# Patient Record
Sex: MALE | Race: WHITE | Employment: FULL TIME | URBAN - METROPOLITAN AREA
[De-identification: names, ages, dates, MRNs, and addresses within clinical notes are randomized per-mention and may not be internally consistent; named-entity substitution may affect disease eponyms.]

---

## 2020-09-04 ENCOUNTER — APPOINTMENT (OUTPATIENT)
Dept: GENERAL RADIOLOGY | Facility: CLINIC | Age: 35
End: 2020-09-04
Attending: EMERGENCY MEDICINE
Payer: COMMERCIAL

## 2020-09-04 ENCOUNTER — HOSPITAL ENCOUNTER (EMERGENCY)
Facility: CLINIC | Age: 35
Discharge: HOME | End: 2020-09-04
Attending: EMERGENCY MEDICINE | Admitting: EMERGENCY MEDICINE
Payer: COMMERCIAL

## 2020-09-04 VITALS
BODY MASS INDEX: 29.73 KG/M2 | HEIGHT: 66 IN | WEIGHT: 185 LBS | TEMPERATURE: 98.7 F | OXYGEN SATURATION: 97 % | HEART RATE: 67 BPM | SYSTOLIC BLOOD PRESSURE: 109 MMHG | DIASTOLIC BLOOD PRESSURE: 83 MMHG | RESPIRATION RATE: 16 BRPM

## 2020-09-04 DIAGNOSIS — S61.411A LACERATION OF RIGHT HAND, FOREIGN BODY PRESENCE UNSPECIFIED, INITIAL ENCOUNTER: ICD-10-CM

## 2020-09-04 PROCEDURE — 99283 EMERGENCY DEPT VISIT LOW MDM: CPT

## 2020-09-04 PROCEDURE — 73140 X-RAY EXAM OF FINGER(S): CPT | Mod: RT

## 2020-09-04 PROCEDURE — 12004 RPR S/N/AX/GEN/TRK7.6-12.5CM: CPT

## 2020-09-04 RX ORDER — CEPHALEXIN 500 MG/1
500 CAPSULE ORAL 4 TIMES DAILY
Qty: 28 CAPSULE | Refills: 0 | Status: SHIPPED | OUTPATIENT
Start: 2020-09-04 | End: 2020-09-11

## 2020-09-04 ASSESSMENT — ENCOUNTER SYMPTOMS: WOUND: 1

## 2020-09-04 ASSESSMENT — MIFFLIN-ST. JEOR: SCORE: 1726.9

## 2020-09-04 NOTE — ED PROVIDER NOTES
"History     Chief Complaint:  Laceration       HPI  Mingo Mendoza is a right handed 34 year old male who presents for evaluation of lacerations. The patient states that he was using a chainsaw to cut a branch when the saw slipped and cut the radial dorsal aspect of his right wrist as well as along the dorsal portion of his index finger. Patient's Tdap was in 2013.    Allergies:  No Known Drug Allergies      Medications:   Albuterol   Tessalon     Medical History:   Past medical history reviewed. No pertinent medical history.     Surgical History   Surgical history reviewed. No pertinent surgical history.     Family History:   Family history reviewed. No pertinent family history.     Social History:  Patient was not accompanied to the ED.  Smoking Status: Negative   Smokeless Tobacco: Negative   Alcohol Use: Negative   Drug Use: Negative   Primary Physician: No primary care provider on file.       Review of Systems   Skin: Positive for wound.   All other systems reviewed and are negative.      Physical Exam     Patient Vitals for the past 24 hrs:   BP Temp Temp src Pulse Resp SpO2 Height Weight   09/04/20 1744 96/57 -- -- -- -- -- -- --   09/04/20 1743 (!) 72/40 98.7  F (37.1  C) Temporal 66 18 98 % 1.676 m (5' 6\") 83.9 kg (185 lb)          Physical Exam  Constitutional: White male supine.   MSK: Right arm. 5 cm full thickness laceration over dorsal radial hand extending to wrist. No foreign body seen. 3 cm laceration of dorsum pip joint of index finger. Able to extend finger fully against resistance. Normal sensation to light touch. No foreign body seen. Distal CMS of thumb and all fingers normal.  NEURO: Awake, alert, and appropriate.     Emergency Department Course     Imaging:  Radiology results were communicated with the patient who voiced understanding of the findings.    XR Finger Right G/E 2 Views  Final Result  IMPRESSION: No apparent fracture or dislocation.      Reading per radiology "       Procedure:    Finger Laceration repair #1:  5 cm full thickness lac over dorsum of the hand. This was copiously irrigated and explored for foreign body, and never/tendon injury and none was found. And approximated with 5 0 ethylon. 0.5 percent Sensorcaine with epi was used for local anesthesia.     Finger laceration repair #2:   3 cm laceration, full thickness;  0.5% Sensorcaine without was used to digital block and was this was obtained.  A finger cot was placed and the wound was explored to the base and there did not appear to be joint involvement. There may have been some scuffing of the capsule of the joint. No tendon or foreign body. This was also approximated with 5.0 Ethilon. Vasatrason and gauze were used for dressing and a small dorsal splint over the index finger.      Emergency Department Course:    1835 Nursing notes and vitals reviewed.    1845 I performed an exam of the patient as documented above.     1950 I performed the laceration repair procedure as documented above.     2038 The patient was sent for XR while in the emergency department, results above.       Findings and plan explained to the Patient. Patient discharged home with instructions regarding supportive care, medications, and reasons to return. The importance of close follow-up was reviewed. The patient was prescribed as below.    Impression & Plan     Medical Decision Making:  Mingo Mendoza is a 34 year old male who was working with a chainsaw and he cut his right hand and he is right hand dominant. He has a 5 cm laceration over the dorsal radial aspect of the hand as well as a 3 cm laceration of the dorsum of the index finger pip joint. There are no initial neurological deficits. XR of the finger and had is unremarkable    Patient will be empirically started on Keflex given that he may have partially injured the joint capsule of the index finger. He should wear the splint for 48-72 hours, keep the hand dry, and follow up for  suture removal in 10-14 days.       Diagnosis:     ICD-10-CM    1. Laceration of right hand, foreign body presence unspecified, initial encounter  S61.411A         Disposition:  Discharged to home.    Discharge Medications:  Discharge Medication List as of 9/4/2020  9:02 PM      START taking these medications    Details   cephALEXin (KEFLEX) 500 MG capsule Take 1 capsule (500 mg) by mouth 4 times daily for 7 days, Disp-28 capsule,R-0, Local Print             Scribe Disclosure:  I, Felipe German, am serving as a scribe at 6:38 PM on 9/4/2020 to document services personally performed by Damien Meza MD based on my observations and the provider's statements to me.     [facility]     Damien Meza MD  09/04/20 6677

## 2020-09-04 NOTE — ED AVS SNAPSHOT
Emergency Department  [address] [facility] [address]  Phone:  [phone]  Fax:  [phone]                                    Mingo Mendoza   MRN: [id number]    Department:   Emergency Department   Date of Visit:  9/4/2020           After Visit Summary Signature Page    I have received my discharge instructions, and my questions have been answered. I have discussed any challenges I see with this plan with the nurse or doctor.    ..........................................................................................................................................  Patient/Patient Representative Signature      ..........................................................................................................................................  Patient Representative Print Name and Relationship to Patient    ..................................................               ................................................  Date                                   Time    ..........................................................................................................................................  Reviewed by Signature/Title    ...................................................              ..............................................  Date                                               Time          22EPIC Rev 08/18